# Patient Record
Sex: MALE | Race: WHITE | Employment: OTHER | URBAN - METROPOLITAN AREA
[De-identification: names, ages, dates, MRNs, and addresses within clinical notes are randomized per-mention and may not be internally consistent; named-entity substitution may affect disease eponyms.]

---

## 2017-07-13 DIAGNOSIS — E78.5 HYPERLIPIDEMIA LDL GOAL <100: Primary | ICD-10-CM

## 2017-07-13 LAB
ALBUMIN SERPL-MCNC: 3.9 G/DL (ref 3.4–5)
ALP SERPL-CCNC: 68 U/L (ref 40–150)
ALT SERPL W P-5'-P-CCNC: 29 U/L (ref 0–70)
ANION GAP SERPL CALCULATED.3IONS-SCNC: 7 MMOL/L (ref 3–14)
AST SERPL W P-5'-P-CCNC: 20 U/L (ref 0–45)
BILIRUB SERPL-MCNC: 0.6 MG/DL (ref 0.2–1.3)
BUN SERPL-MCNC: 19 MG/DL (ref 7–30)
CALCIUM SERPL-MCNC: 8.6 MG/DL (ref 8.5–10.1)
CHLORIDE SERPL-SCNC: 106 MMOL/L (ref 94–109)
CHOLEST SERPL-MCNC: 144 MG/DL
CO2 SERPL-SCNC: 26 MMOL/L (ref 20–32)
CREAT SERPL-MCNC: 1.21 MG/DL (ref 0.66–1.25)
GFR SERPL CREATININE-BSD FRML MDRD: 59 ML/MIN/1.7M2
GLUCOSE SERPL-MCNC: 92 MG/DL (ref 70–99)
HBA1C MFR BLD: 5.5 % (ref 4.3–6)
HDLC SERPL-MCNC: 42 MG/DL
LDLC SERPL CALC-MCNC: 60 MG/DL
NONHDLC SERPL-MCNC: 102 MG/DL
POTASSIUM SERPL-SCNC: 3.8 MMOL/L (ref 3.4–5.3)
PROT SERPL-MCNC: 7.2 G/DL (ref 6.8–8.8)
SODIUM SERPL-SCNC: 140 MMOL/L (ref 133–144)
TRIGL SERPL-MCNC: 208 MG/DL

## 2017-07-17 ENCOUNTER — OFFICE VISIT (OUTPATIENT)
Dept: CARDIOLOGY | Facility: CLINIC | Age: 72
End: 2017-07-17

## 2017-07-17 VITALS
DIASTOLIC BLOOD PRESSURE: 78 MMHG | HEART RATE: 55 BPM | HEIGHT: 67 IN | SYSTOLIC BLOOD PRESSURE: 154 MMHG | BODY MASS INDEX: 30.29 KG/M2 | OXYGEN SATURATION: 97 % | WEIGHT: 193 LBS

## 2017-07-17 DIAGNOSIS — R53.83 FATIGUE, UNSPECIFIED TYPE: ICD-10-CM

## 2017-07-17 DIAGNOSIS — I10 ESSENTIAL HYPERTENSION: Primary | ICD-10-CM

## 2017-07-17 DIAGNOSIS — I10 ESSENTIAL HYPERTENSION: ICD-10-CM

## 2017-07-17 LAB
ERYTHROCYTE [DISTWIDTH] IN BLOOD BY AUTOMATED COUNT: 12.9 % (ref 10–15)
HCT VFR BLD AUTO: 43.1 % (ref 40–53)
HGB BLD-MCNC: 14.8 G/DL (ref 13.3–17.7)
MCH RBC QN AUTO: 30.7 PG (ref 26.5–33)
MCHC RBC AUTO-ENTMCNC: 34.3 G/DL (ref 31.5–36.5)
MCV RBC AUTO: 89 FL (ref 78–100)
PLATELET # BLD AUTO: 279 10E9/L (ref 150–450)
RBC # BLD AUTO: 4.82 10E12/L (ref 4.4–5.9)
TSH SERPL DL<=0.005 MIU/L-ACNC: 0.53 MU/L (ref 0.4–4)
WBC # BLD AUTO: 8.1 10E9/L (ref 4–11)

## 2017-07-17 RX ORDER — BRIMONIDINE TARTRATE 1.5 MG/ML
1 SOLUTION/ DROPS OPHTHALMIC 2 TIMES DAILY
COMMUNITY
Start: 2017-05-23

## 2017-07-17 RX ORDER — LOSARTAN POTASSIUM 100 MG/1
100 TABLET ORAL DAILY
Qty: 90 TABLET | Refills: 0 | Status: SHIPPED | OUTPATIENT
Start: 2017-07-17 | End: 2017-10-17

## 2017-07-17 RX ORDER — HYOSCYAMINE SULFATE 0.125 MG
0.12 TABLET ORAL PRN
COMMUNITY
Start: 2016-10-18

## 2017-07-17 NOTE — LETTER
7/17/2017      RE: Junior Ruiz  [address]   [facility] [address]       Dear Colleague,    Thank you for the opportunity to participate in the care of your patient, Junior Ruiz, at the [facility]. Please see a copy of my visit note below.    PROBLEM LIST  Patient Active Problem List   Diagnosis     Hyperlipidemia     Tremor     Hypertension     Lacunar stroke (H)     Abnormal carotid ultrasound     Gout     Depression     Anxiety     Glaucoma     Esophageal reflux     Erectile dysfunction     Melanoma (HCC)     Lacunar infarction (HCC)     Gastric ulcer     Basal cell carcinoma     Hearing loss     S/P cataract surgery     Wears glasses     Wears hearing aid     HL (hearing loss), left     Snores     Sleep disorder     Arthritis     Laboratory test       HPI:   Junior Ruiz is a 71 year old year old male with a history of  lacunar stroke per MRI age 51, hypertension, hyperlipidemia, gout and glaucoma. He was feeling depressed and negative in the mornings and felt it was related to his valsartan so he DC'd it about 3 weeks ago and his symptoms resolved. He has also been trying to taper his Niacin and the dose is down to 250 mg every other day. When he tried to abruptly DC he had symptoms of mood swings and negative thoughts.    He does report feeling sluggish and wonders if it is related to his pulse which runs in the upper 40-50's. He is not taking any oral medications that typically effect heart rate. He is able to exercise and does a 45 minute work out most days. He has lost about 20 pounds about 1.5 years ago. He has not chest pain or shortness of breath.     PAST MEDICAL HISTORY:  Past Medical History:   Diagnosis Date     Abnormal carotid ultrasound 4/9/2016    11/2/15  2:55 PM [id number] [facility], [place], Ultrasound    [employer] Interactive Report and InfoRx    View the  interactive report   PACS Images    Show images for US carotid bilateral   Study Result    US CAROTID BILATERAL 11/2/2015 2:55 PM   Clinical history: Encounter for screening for cardiovascular disease, tremor, unspecified, cerebral infarction, unspecified the occlusion and stenosis of bilat     Anxiety 4/9/2016     Basal cell carcinoma 2015     Cardiovascular risk factor 10/2015    Mccallum early CVD score 8/20 with 0 all tests normal     Depression 4/9/2016     Erectile dysfunction      Esophageal reflux 4/9/2016     Gastric ulcer age 24     Glaucoma 1995     Glaucoma 4/9/2016     Gout 2007     Gout 4/9/2016     H/O echocardiogram 2005    reported to be normal     Hearing loss      HL (hearing loss), left 4/12/2016     Hyperlipidemia age 36     Hypertension age 46     Laboratory test 4/27/2016    Octavio Pratt MD 4/27/2016           Narrative         Examination: Nuclear medicine DATscan for Dopamine Receptor Localization.   Examination: NM BRAIN IMAGING TOMOGRAPHIC (SPECT) DATSCAN  Date: 4/27/2016 3:54 PM  Indication: Tremor  Comparison: None  Additional Information: none  Interfering Medications: None  Technique:  The patient initially received 1 ml of Lugol's solution orally p     Lacunar infarction (H) age 51    per MRI     Lacunar stroke (H) age 51    per CT     Melanoma (H) 2011    back     S/P cataract surgery 4/12/2016     Tremor     ? related to lacunar CVA     Wears glasses 4/12/2016     Wears hearing aid 4/12/2016       CURRENT MEDICATIONS:  Current Outpatient Prescriptions   Medication Sig Dispense Refill     brimonidine (ALPHAGAN-P) 0.15 % ophthalmic solution Place 1 drop into both eyes 2 times daily       hyoscyamine (ANASPAZ/LEVSIN) 0.125 MG tablet Take 0.125 mg by mouth as needed       losartan (COZAAR) 100 MG tablet Take 1 tablet (100 mg) by mouth daily 90 tablet 0     atorvastatin (LIPITOR) 20 MG tablet Take 1 tablet (20 mg) by mouth daily 90 tablet 3     niacin (NIASPAN) 500 MG CR  tablet Take 1 tablet (500 mg) by mouth At Bedtime 30 tablet 3     allopurinol (ZYLOPRIM) 300 MG tablet Take 300 mg by mouth daily       AMLODIPINE BESYLATE PO Take 5 mg by mouth 2 times daily       aspirin 81 MG tablet Take 81 mg by mouth daily       Omega-3 Fatty Acids (OMEGA-3 FISH OIL PO) Take 1 g by mouth daily       hydrochlorothiazide (MICROZIDE) 12.5 MG capsule Take 12.5 mg by mouth 2 times daily        bimatoprost (LUMIGAN) 0.01 % SOLN Place 1 drop into both eyes At Bedtime       Multiple Vitamins-Minerals (MULTIVITAMIN PO) Take 1 tablet by mouth daily       Cholecalciferol (VITAMIN D3 PO) Take 1,000 Units by mouth daily       Tadalafil (CIALIS PO) Take 1 tablet by mouth as needed for erectile dysfunction       hyoscyamine (LEVBID) 0.375 MG 12 hr tablet Take 0.375 mg by mouth every 12 hours as needed for cramping       valsartan (DIOVAN) 80 MG tablet Take 1 tablet (80 mg) by mouth daily (Patient not taking: Reported on 2017) 90 tablet 3       PAST SURGICAL HISTORY:  Past Surgical History:   Procedure Laterality Date     BACK SURGERY      skin cancer, melanoma     EYE SURGERY Bilateral     glaucoma     EYE SURGERY Left     cataract     EYE SURGERY Right     cataract      HEMORRHOID SURGERY       KNEE SURGERY Right     arthroscopy     RHINOPLASTY  1979       ALLERGIES     Allergies   Allergen Reactions     Atorvastatin Other (See Comments)     Muscle pain with 80 mg, ok with 40 mg per day     Irbesartan      Head congestion with 150 mg dosage.     Bentyl [Dicyclomine] Itching     Colchicine      Lisinopril Cough     Penicillins      Sulfamethoxazole-Trimethoprim Other (See Comments)     dizziness     Verapamil      Changes in thinking       FAMILY HISTORY:  Family History   Problem Relation Age of Onset     Hypertension Mother 65     92 in 2015     CEREBROVASCULAR DISEASE Mother 65     Coronary Artery Disease Father 58      age 92     CEREBROVASCULAR DISEASE Father 65     Abdominal Aortic  Aneurysm Father      Brain Cancer Son      Hypertension Sister 60     65 in 2016       SOCIAL HISTORY:  Social History     Social History     Marital status:      Spouse name: N/A     Number of children: N/A     Years of education: N/A     Social History Main Topics     Smoking status: Former Smoker     Packs/day: 1.00     Types: Cigarettes     Start date: 10/19/1971     Quit date: 10/19/1991     Smokeless tobacco: Not on file     Alcohol use 4.2 oz/week     7 Glasses of wine per week      Comment: alcohol may help his writing      Drug use: Not on file     Sexual activity: Not on file     Other Topics Concern     Not on file     Social History Antony    Works as ,  and is single  with 2 children ages 33 and 29.      Enjoyment of life is 7 with 10 enjoys life fully.         and Viyet.        Junior Ruiz is a 69 year old year old male with a history of lacunar stroke per MRI age 51, hypertension, hyperlipidemia, gout and glaucoma.         He has a family history of cardiovascular disease with both parents, father had AAA and CABG in his late 50's and mother CVA in her 60's but both survived into their 90's.         He has no chest pain or shortness of breath but does have some lower leg edema related to amlodipine. He tried a higher dose of irbesartan but developed head congestion. He is able to tolerate atorvastatin 40 mg per day but developed muscle pain with 80 mg  Dosage. He reports an echo about 10 years ago that was normal and a CAC score about 10 years ago that was consistent with his age but does not recall a score.         He has a history of elevated triglycerides in the 3-500's prior to taking atorvastatin and niaspan. He also has a history of tremor thought to be related to past lacunar stroke. The tremor is increasingly worse and makes witting difficult. We discussed referal to neurology. He has tried atenolol in the  "past for tremor but his heart rate decreased to the 40's and he did not feel well.       ROS:   Constitutional: No fever, chills, or sweats. No weight gain/loss   ENT: No visual disturbance, ear ache, epistaxis, sore throat  Allergies/Immunologic: Negative.   Respiratory: No cough, hemoptysia  Cardiovascular: As per HPI  GI: No nausea, vomiting, hematemesis, melena, or hematochezia  : No urinary frequency, dysuria, or hematuria  Integument: Negative  Psychiatric: Negative  Neuro: Negative  Endocrinology: Negative   Musculoskeletal: Negative    EXAM:  /78 (BP Location: Right arm, Patient Position: Sitting, Cuff Size: Adult Regular)  Pulse 55  Ht 1.702 m (5' 7\")  Wt 87.5 kg (193 lb)  SpO2 97%  BMI 30.23 kg/m2  In general, the patient is a pleasant male in no apparent distress.    HEENT: NC/AT.  PERRLA.  EOMI.  Sclerae white, not injected.  Nares clear.  Pharynx without erythema or exudate.  Dentition intact.    Neck: No adenopathy.  No thyromegaly. Carotids +4/4 bilaterally without bruits.  No jugular venous distension.   Heart: RRR. Normal S1, S2 splits physiologically. No murmur, rub, click, or gallop. The PMI is in the 5th ICS in the midclavicular line. There is no heave.    Lungs: CTA.  No ronchi, wheezes, rales.  No dullness to percussion.   Abdomen: Soft, nontender, nondistended. No organomegaly.  No bruits.   Extremities: No clubbing, cyanosis, or edema.  The pulses are +4/4 at the radial, brachial, femoral, popliteal, DP, and PT sites bilaterally.  No bruits are noted.      Labs:  LIPID RESULTS:  Lab Results   Component Value Date    CHOL 144 07/12/2017     Lab Results   Component Value Date    HDL 42 07/12/2017     Lab Results   Component Value Date    LDL 60 07/12/2017     Lab Results   Component Value Date    TRIG 208 07/12/2017     Lab Results   Component Value Date    CHOLHDLRATIO 2.2 10/19/2015       Lab Results   Component Value Date    AST 20 07/12/2017     Lab Results   Component Value " "Date    ALT 29 07/12/2017       CBC RESULTS:  Lab Results   Component Value Date    HGB 14.8 07/17/2017     Lab Results   Component Value Date    WBC 8.1 07/17/2017     Lab Results   Component Value Date    RBC 4.82 07/17/2017     Lab Results   Component Value Date    HCT 43.1 07/17/2017     Lab Results   Component Value Date    MCV 89 07/17/2017     Lab Results   Component Value Date    MCH 30.7 07/17/2017     Lab Results   Component Value Date    MCHC 34.3 07/17/2017     Lab Results   Component Value Date    RDW 12.9 07/17/2017     Lab Results   Component Value Date     07/17/2017       BMP RESULTS:  Lab Results   Component Value Date     07/12/2017      Lab Results   Component Value Date    POTASSIUM 3.8 07/12/2017     Lab Results   Component Value Date    CHLORIDE 106 07/12/2017     Lab Results   Component Value Date    SUNNY 8.6 07/12/2017     Lab Results   Component Value Date    CO2 26 07/12/2017     Lab Results   Component Value Date    BUN 19 07/12/2017     Lab Results   Component Value Date    CR 1.21 07/12/2017     Lab Results   Component Value Date    GFRESTIMATED 59 07/12/2017     Lab Results   Component Value Date    GLC 92 07/12/2017       Lab Results   Component Value Date    A1C 5.5 07/12/2017     No results found for: INR    Procedures:      Assessment and Plan:   Cardiovascular: No chest pain or shortness of breath. No new neurological symptoms. He does have some \"sluggish, lethargy\" at times and wonders if this is related to heart rate. Unlikely his symptoms are related to his pulse as there has not been significant change in the last year.  Will check TSH and Hg. He sleeps alone so does not know if he has sleep apnea or not. We discussed possible sleep study to check for sleep apnea which can also contribute to mental health problems. We also discussed if he feels depressed or short temper to discuss with family provider consideration of SSRI.     Blood pressure: Elevated today since " he DC'd valsartan because of possible mood alteration. This is not a common side effect but he feels better since he is not taking it. Will attempt losartan 100 mg per day to see if it is better tolerated. He will check home BP's and call me in two weeks to let me know how he is doing with medication change.     Lipids: at target with 20 mg of atorvastatin. Alteration in mood has been identified with some people taking atorvastatin but he has been on this medication for some time without problems. Triglycerides are mildly elevated with reduction of Niacin. Will increase fish oil if his triglycerides continue to increase.       Please do not hesitate to contact me if you have any questions/concerns.     Sincerely,     ANUP Sainz CNP

## 2017-07-17 NOTE — PROGRESS NOTES
PROBLEM LIST  Patient Active Problem List   Diagnosis     Hyperlipidemia     Tremor     Hypertension     Lacunar stroke (H)     Abnormal carotid ultrasound     Gout     Depression     Anxiety     Glaucoma     Esophageal reflux     Erectile dysfunction     Melanoma (HCC)     Lacunar infarction (HCC)     Gastric ulcer     Basal cell carcinoma     Hearing loss     S/P cataract surgery     Wears glasses     Wears hearing aid     HL (hearing loss), left     Snores     Sleep disorder     Arthritis     Laboratory test       HPI:   Junior Ruiz is a 71 year old year old male with a history of  lacunar stroke per MRI age 51, hypertension, hyperlipidemia, gout and glaucoma. He was feeling depressed and negative in the mornings and felt it was related to his valsartan so he DC'd it about 3 weeks ago and his symptoms resolved. He has also been trying to taper his Niacin and the dose is down to 250 mg every other day. When he tried to abruptly DC he had symptoms of mood swings and negative thoughts.    He does report feeling sluggish and wonders if it is related to his pulse which runs in the upper 40-50's. He is not taking any oral medications that typically effect heart rate. He is able to exercise and does a 45 minute work out most days. He has lost about 20 pounds about 1.5 years ago. He has not chest pain or shortness of breath.     PAST MEDICAL HISTORY:  Past Medical History:   Diagnosis Date     Abnormal carotid ultrasound 4/9/2016    11/2/15  2:55 PM [id number] [facility] [place], Ultrasound    [employer] Interactive Report and InfoRx    View the interactive report   PACS Images    Show images for US carotid bilateral   Study Result    US CAROTID BILATERAL 11/2/2015 2:55 PM   Clinical history: Encounter for screening for cardiovascular disease, tremor, unspecified, cerebral infarction, unspecified the occlusion and stenosis of bilat     Anxiety 4/9/2016     Basal cell carcinoma 2015     Cardiovascular risk factor  10/2015    Mccallum early CVD score 8/20 with 0 all tests normal     Depression 4/9/2016     Erectile dysfunction      Esophageal reflux 4/9/2016     Gastric ulcer age 24     Glaucoma 1995     Glaucoma 4/9/2016     Gout 2007     Gout 4/9/2016     H/O echocardiogram 2005    reported to be normal     Hearing loss      HL (hearing loss), left 4/12/2016     Hyperlipidemia age 36     Hypertension age 46     Laboratory test 4/27/2016    Octavio Pratt MD 4/27/2016           Narrative         Examination: Nuclear medicine DATscan for Dopamine Receptor Localization.   Examination: NM BRAIN IMAGING TOMOGRAPHIC (SPECT) DATSCAN  Date: 4/27/2016 3:54 PM  Indication: Tremor  Comparison: None  Additional Information: none  Interfering Medications: None  Technique:  The patient initially received 1 ml of Lugol's solution orally p     Lacunar infarction (H) age 51    per MRI     Lacunar stroke (H) age 51    per CT     Melanoma (H) 2011    back     S/P cataract surgery 4/12/2016     Tremor     ? related to lacunar CVA     Wears glasses 4/12/2016     Wears hearing aid 4/12/2016       CURRENT MEDICATIONS:  Current Outpatient Prescriptions   Medication Sig Dispense Refill     brimonidine (ALPHAGAN-P) 0.15 % ophthalmic solution Place 1 drop into both eyes 2 times daily       hyoscyamine (ANASPAZ/LEVSIN) 0.125 MG tablet Take 0.125 mg by mouth as needed       losartan (COZAAR) 100 MG tablet Take 1 tablet (100 mg) by mouth daily 90 tablet 0     atorvastatin (LIPITOR) 20 MG tablet Take 1 tablet (20 mg) by mouth daily 90 tablet 3     niacin (NIASPAN) 500 MG CR tablet Take 1 tablet (500 mg) by mouth At Bedtime 30 tablet 3     allopurinol (ZYLOPRIM) 300 MG tablet Take 300 mg by mouth daily       AMLODIPINE BESYLATE PO Take 5 mg by mouth 2 times daily       aspirin 81 MG tablet Take 81 mg by mouth daily       Omega-3 Fatty Acids (OMEGA-3 FISH OIL PO) Take 1 g by mouth daily       hydrochlorothiazide (MICROZIDE) 12.5 MG capsule Take  12.5 mg by mouth 2 times daily        bimatoprost (LUMIGAN) 0.01 % SOLN Place 1 drop into both eyes At Bedtime       Multiple Vitamins-Minerals (MULTIVITAMIN PO) Take 1 tablet by mouth daily       Cholecalciferol (VITAMIN D3 PO) Take 1,000 Units by mouth daily       Tadalafil (CIALIS PO) Take 1 tablet by mouth as needed for erectile dysfunction       hyoscyamine (LEVBID) 0.375 MG 12 hr tablet Take 0.375 mg by mouth every 12 hours as needed for cramping       valsartan (DIOVAN) 80 MG tablet Take 1 tablet (80 mg) by mouth daily (Patient not taking: Reported on 2017) 90 tablet 3       PAST SURGICAL HISTORY:  Past Surgical History:   Procedure Laterality Date     BACK SURGERY      skin cancer, melanoma     EYE SURGERY Bilateral     glaucoma     EYE SURGERY Left     cataract     EYE SURGERY Right     cataract      HEMORRHOID SURGERY       KNEE SURGERY Right     arthroscopy     RHINOPLASTY         ALLERGIES     Allergies   Allergen Reactions     Atorvastatin Other (See Comments)     Muscle pain with 80 mg, ok with 40 mg per day     Irbesartan      Head congestion with 150 mg dosage.     Bentyl [Dicyclomine] Itching     Colchicine      Lisinopril Cough     Penicillins      Sulfamethoxazole-Trimethoprim Other (See Comments)     dizziness     Verapamil      Changes in thinking       FAMILY HISTORY:  Family History   Problem Relation Age of Onset     Hypertension Mother 65     92 in 2015     CEREBROVASCULAR DISEASE Mother 65     Coronary Artery Disease Father 58      age 92     CEREBROVASCULAR DISEASE Father 65     Abdominal Aortic Aneurysm Father      Brain Cancer Son      Hypertension Sister 60     65 in 2016       SOCIAL HISTORY:  Social History     Social History     Marital status:      Spouse name: N/A     Number of children: N/A     Years of education: N/A     Social History Main Topics     Smoking status: Former Smoker     Packs/day: 1.00     Types: Cigarettes     Start date: 10/19/1971      Quit date: 10/19/1991     Smokeless tobacco: Not on file     Alcohol use 4.2 oz/week     7 Glasses of wine per week      Comment: alcohol may help his writing      Drug use: Not on file     Sexual activity: Not on file     Other Topics Concern     Not on file     Social History Antony    Works as ,  and is single  with 2 children ages 33 and 29.      Enjoyment of life is 7 with 10 enjoys life fully.         and [employer].        Junior Ruiz is a 69 year old year old male with a history of lacunar stroke per MRI age 51, hypertension, hyperlipidemia, gout and glaucoma.         He has a family history of cardiovascular disease with both parents, father had AAA and CABG in his late 50's and mother CVA in her 60's but both survived into their 90's.         He has no chest pain or shortness of breath but does have some lower leg edema related to amlodipine. He tried a higher dose of irbesartan but developed head congestion. He is able to tolerate atorvastatin 40 mg per day but developed muscle pain with 80 mg  Dosage. He reports an echo about 10 years ago that was normal and a CAC score about 10 years ago that was consistent with his age but does not recall a score.         He has a history of elevated triglycerides in the 3-500's prior to taking atorvastatin and niaspan. He also has a history of tremor thought to be related to past lacunar stroke. The tremor is increasingly worse and makes witting difficult. We discussed referal to neurology. He has tried atenolol in the past for tremor but his heart rate decreased to the 40's and he did not feel well.       ROS:   Constitutional: No fever, chills, or sweats. No weight gain/loss   ENT: No visual disturbance, ear ache, epistaxis, sore throat  Allergies/Immunologic: Negative.   Respiratory: No cough, hemoptysia  Cardiovascular: As per HPI  GI: No nausea, vomiting, hematemesis, melena, or  "hematochezia  : No urinary frequency, dysuria, or hematuria  Integument: Negative  Psychiatric: Negative  Neuro: Negative  Endocrinology: Negative   Musculoskeletal: Negative    EXAM:  /78 (BP Location: Right arm, Patient Position: Sitting, Cuff Size: Adult Regular)  Pulse 55  Ht 1.702 m (5' 7\")  Wt 87.5 kg (193 lb)  SpO2 97%  BMI 30.23 kg/m2  In general, the patient is a pleasant male in no apparent distress.    HEENT: NC/AT.  PERRLA.  EOMI.  Sclerae white, not injected.  Nares clear.  Pharynx without erythema or exudate.  Dentition intact.    Neck: No adenopathy.  No thyromegaly. Carotids +4/4 bilaterally without bruits.  No jugular venous distension.   Heart: RRR. Normal S1, S2 splits physiologically. No murmur, rub, click, or gallop. The PMI is in the 5th ICS in the midclavicular line. There is no heave.    Lungs: CTA.  No ronchi, wheezes, rales.  No dullness to percussion.   Abdomen: Soft, nontender, nondistended. No organomegaly.  No bruits.   Extremities: No clubbing, cyanosis, or edema.  The pulses are +4/4 at the radial, brachial, femoral, popliteal, DP, and PT sites bilaterally.  No bruits are noted.      Labs:  LIPID RESULTS:  Lab Results   Component Value Date    CHOL 144 07/12/2017     Lab Results   Component Value Date    HDL 42 07/12/2017     Lab Results   Component Value Date    LDL 60 07/12/2017     Lab Results   Component Value Date    TRIG 208 07/12/2017     Lab Results   Component Value Date    CHOLHDLRATIO 2.2 10/19/2015       Lab Results   Component Value Date    AST 20 07/12/2017     Lab Results   Component Value Date    ALT 29 07/12/2017       CBC RESULTS:  Lab Results   Component Value Date    HGB 14.8 07/17/2017     Lab Results   Component Value Date    WBC 8.1 07/17/2017     Lab Results   Component Value Date    RBC 4.82 07/17/2017     Lab Results   Component Value Date    HCT 43.1 07/17/2017     Lab Results   Component Value Date    MCV 89 07/17/2017     Lab Results " "  Component Value Date    MCH 30.7 07/17/2017     Lab Results   Component Value Date    MCHC 34.3 07/17/2017     Lab Results   Component Value Date    RDW 12.9 07/17/2017     Lab Results   Component Value Date     07/17/2017       BMP RESULTS:  Lab Results   Component Value Date     07/12/2017      Lab Results   Component Value Date    POTASSIUM 3.8 07/12/2017     Lab Results   Component Value Date    CHLORIDE 106 07/12/2017     Lab Results   Component Value Date    SUNNY 8.6 07/12/2017     Lab Results   Component Value Date    CO2 26 07/12/2017     Lab Results   Component Value Date    BUN 19 07/12/2017     Lab Results   Component Value Date    CR 1.21 07/12/2017     Lab Results   Component Value Date    GFRESTIMATED 59 07/12/2017     Lab Results   Component Value Date    GLC 92 07/12/2017       Lab Results   Component Value Date    A1C 5.5 07/12/2017     No results found for: INR    Procedures:      Assessment and Plan:   Cardiovascular: No chest pain or shortness of breath. No new neurological symptoms. He does have some \"sluggish, lethargy\" at times and wonders if this is related to heart rate. Unlikely his symptoms are related to his pulse as there has not been significant change in the last year.  Will check TSH and Hg. He sleeps alone so does not know if he has sleep apnea or not. We discussed possible sleep study to check for sleep apnea which can also contribute to mental health problems. We also discussed if he feels depressed or short temper to discuss with family provider consideration of SSRI.     Blood pressure: Elevated today since he DC'd valsartan because of possible mood alteration. This is not a common side effect but he feels better since he is not taking it. Will attempt losartan 100 mg per day to see if it is better tolerated. He will check home BP's and call me in two weeks to let me know how he is doing with medication change.     Lipids: at target with 20 mg of atorvastatin. " Alteration in mood has been identified with some people taking atorvastatin but he has been on this medication for some time without problems. Triglycerides are mildly elevated with reduction of Niacin. Will increase fish oil if his triglycerides continue to increase.

## 2017-07-17 NOTE — PATIENT INSTRUCTIONS
Begin losartan 100 mg per day in place of valsartan.   Call Brittany [phone] in two weeks with 6 blood pressure readings, alternate morning, afternoon and evening.  Call sooner if not feeling well on the medication.

## 2017-07-17 NOTE — MR AVS SNAPSHOT
After Visit Summary   7/17/2017    Junior Ruiz    MRN: [id number]           Patient Information     Date Of Birth          1945        Visit Information        Provider Department      7/17/2017 1:00 PM Brittany Carlisle APRN CNP [facility]        Today's Diagnoses     Essential hypertension    -  1      Care Instructions    Begin losartan 100 mg per day in place of valsartan.   Call Brittany [phone] in two weeks with 6 blood pressure readings, alternate morning, afternoon and evening.  Call sooner if not feeling well on the medication.          Follow-ups after your visit        Who to contact     Please call your clinic at [phone] to:    Ask questions about your health    Make or cancel appointments    Discuss your medicines    Learn about your test results    Speak to your doctor   If you have compliments or concerns about an experience at your clinic, or if you wish to file a complaint, please contact [facility] Patient Relations at [phone] or email us at [email or web address]         Additional Information About Your Visit        MyChart Information     [employer] gives you secure access to your electronic health record. If you see a primary care provider, you can also send messages to your care team and make appointments. If you have questions, please call your primary care clinic.  If you do not have a primary care provider, please call [phone] and they will assist you.      [employer] is an electronic gateway that provides easy, online access to your medical records. With [employer], you can request a clinic appointment, read your test results, renew a prescription or communicate with your care team.     To access your existing account, please contact your [facility] or call [phone] for assistance.        Care EveryWhere ID     This is your Care  "EveryWhere ID. This could be used by other organizations to access your [place] medical records  [id number]        Your Vitals Were     Pulse Height Pulse Oximetry BMI (Body Mass Index)          55 1.702 m (5' 7\") 97% 30.23 kg/m2         Blood Pressure from Last 3 Encounters:   07/17/17 154/78   07/18/16 109/46   05/05/16 154/71    Weight from Last 3 Encounters:   07/17/17 87.5 kg (193 lb)   07/18/16 83 kg (183 lb)   05/05/16 86.1 kg (189 lb 12.8 oz)              Today, you had the following     No orders found for display         Today's Medication Changes          These changes are accurate as of: 7/17/17  1:40 PM.  If you have any questions, ask your nurse or doctor.               Start taking these medicines.        Dose/Directions    losartan 100 MG tablet   Commonly known as:  COZAAR   Used for:  Essential hypertension   Started by:  Brittany Carlisle APRN CNP        Dose:  100 mg   Take 1 tablet (100 mg) by mouth daily   Quantity:  90 tablet   Refills:  0            Where to get your medicines      These medications were sent to [facility] #[id number] - [address] [address]     Phone:  [phone]     losartan 100 MG tablet                Primary Care Provider Office Phone # Fax #    Serjio Love [phone]       [facility] [address] [facility] [address]        Equal Access to Services     [facility]: Hadii jeffery ku hadasho Soomaali, waaxda luqadaha, qaybta kaalmada adeegyada, waxrafal idiin hayaan adeletty brown. So [facility] [phone].    ATENCIÓN: Si habla español, tiene a pierson disposición servicios gratuitos de asistencia lingüística. Llame al [phone].    We comply with applicable federal civil rights laws and [place] laws. We do not discriminate on the basis of race, color, national origin, age, disability sex, sexual orientation or gender identity.            Thank you!     " Thank you for choosing [facility]  for your care. Our goal is always to provide you with excellent care. Hearing back from our patients is one way we can continue to improve our services. Please take a few minutes to complete the written survey that you may receive in the mail after your visit with us. Thank you!             Your Updated Medication List - Protect others around you: Learn how to safely use, store and throw away your medicines at www.disposemymeds.org.          This list is accurate as of: 7/17/17  1:40 PM.  Always use your most recent med list.                   Brand Name Dispense Instructions for use Diagnosis    allopurinol 300 MG tablet    ZYLOPRIM     Take 300 mg by mouth daily    Screening for cardiovascular condition, Tremor, Elevated glucose, Lacunar stroke (H), Carotid artery plaque, bilateral       AMLODIPINE BESYLATE PO      Take 5 mg by mouth 2 times daily    Screening for cardiovascular condition, Tremor, Elevated glucose, Lacunar stroke (H), Carotid artery plaque, bilateral       aspirin 81 MG tablet      Take 81 mg by mouth daily    Screening for cardiovascular condition, Tremor, Elevated glucose, Lacunar stroke (H), Carotid artery plaque, bilateral       atorvastatin 20 MG tablet    LIPITOR    90 tablet    Take 1 tablet (20 mg) by mouth daily    Hyperlipidemia, unspecified hyperlipidemia       bimatoprost 0.01 % Soln    LUMIGAN     Place 1 drop into both eyes At Bedtime    Screening for cardiovascular condition, Tremor, Elevated glucose, Lacunar stroke (H), Carotid artery plaque, bilateral       brimonidine 0.15 % ophthalmic solution    ALPHAGAN-P     Place 1 drop into both eyes 2 times daily    Essential hypertension       CIALIS PO      Take 1 tablet by mouth as needed for erectile dysfunction    Screening for cardiovascular condition, Tremor, Elevated glucose, Lacunar stroke (H), Carotid artery plaque, bilateral       hydrochlorothiazide  12.5 MG capsule    MICROZIDE     Take 12.5 mg by mouth 2 times daily    Screening for cardiovascular condition, Tremor, Elevated glucose, Lacunar stroke (H), Carotid artery plaque, bilateral       * hyoscyamine 0.375 MG 12 hr tablet    LEVBID     Take 0.375 mg by mouth every 12 hours as needed for cramping    Screening for cardiovascular condition, Tremor, Elevated glucose, Lacunar stroke (H), Carotid artery plaque, bilateral       * hyoscyamine 0.125 MG tablet    ANASPAZ/LEVSIN     Take 0.125 mg by mouth as needed    Essential hypertension       losartan 100 MG tablet    COZAAR    90 tablet    Take 1 tablet (100 mg) by mouth daily    Essential hypertension       MULTIVITAMIN PO      Take 1 tablet by mouth daily    Screening for cardiovascular condition, Tremor, Elevated glucose, Lacunar stroke (H), Carotid artery plaque, bilateral       niacin 500 MG CR tablet    NIASPAN    30 tablet    Take 1 tablet (500 mg) by mouth At Bedtime    Hyperlipidemia, unspecified hyperlipidemia       OMEGA-3 FISH OIL PO      Take 1 g by mouth daily    Screening for cardiovascular condition, Tremor, Elevated glucose, Lacunar stroke (H), Carotid artery plaque, bilateral       valsartan 80 MG tablet    DIOVAN    90 tablet    Take 1 tablet (80 mg) by mouth daily    Essential hypertension with goal blood pressure less than 140/90, Hyperlipidemia, unspecified hyperlipidemia       VITAMIN D3 PO      Take 1,000 Units by mouth daily    Screening for cardiovascular condition, Tremor, Elevated glucose, Lacunar stroke (H), Carotid artery plaque, bilateral       * Notice:  This list has 2 medication(s) that are the same as other medications prescribed for you. Read the directions carefully, and ask your doctor or other care provider to review them with you.

## 2017-10-17 DIAGNOSIS — R53.83 FATIGUE, UNSPECIFIED TYPE: ICD-10-CM

## 2017-10-17 DIAGNOSIS — I10 ESSENTIAL HYPERTENSION: ICD-10-CM

## 2017-10-17 RX ORDER — LOSARTAN POTASSIUM 100 MG/1
100 TABLET ORAL DAILY
Qty: 90 TABLET | Refills: 3 | Status: SHIPPED | OUTPATIENT
Start: 2017-10-17 | End: 2018-07-27

## 2018-07-24 DIAGNOSIS — R53.83 FATIGUE, UNSPECIFIED TYPE: ICD-10-CM

## 2018-07-24 DIAGNOSIS — I10 ESSENTIAL HYPERTENSION: ICD-10-CM

## 2018-07-24 RX ORDER — LOSARTAN POTASSIUM 100 MG/1
100 TABLET ORAL DAILY
Qty: 90 TABLET | Refills: 3 | Status: CANCELLED | OUTPATIENT
Start: 2018-07-24

## 2018-07-27 DIAGNOSIS — R53.83 FATIGUE, UNSPECIFIED TYPE: ICD-10-CM

## 2018-07-27 DIAGNOSIS — I10 ESSENTIAL HYPERTENSION: ICD-10-CM

## 2018-07-27 RX ORDER — LOSARTAN POTASSIUM 100 MG/1
100 TABLET ORAL DAILY
Qty: 90 TABLET | Refills: 3 | Status: SHIPPED | OUTPATIENT
Start: 2018-07-27

## 2018-11-18 DIAGNOSIS — E78.5 HYPERLIPIDEMIA LDL GOAL <70: Primary | ICD-10-CM

## 2018-11-18 DIAGNOSIS — I10 ESSENTIAL HYPERTENSION: ICD-10-CM

## 2019-10-04 ENCOUNTER — HEALTH MAINTENANCE LETTER (OUTPATIENT)
Age: 74
End: 2019-10-04

## 2020-02-10 ENCOUNTER — HEALTH MAINTENANCE LETTER (OUTPATIENT)
Age: 75
End: 2020-02-10

## 2020-11-14 ENCOUNTER — HEALTH MAINTENANCE LETTER (OUTPATIENT)
Age: 75
End: 2020-11-14

## 2021-03-02 ENCOUNTER — IMMUNIZATION (OUTPATIENT)
Dept: PEDIATRICS | Facility: CLINIC | Age: 76
End: 2021-03-02
Payer: MEDICARE

## 2021-03-02 PROCEDURE — 91300 PR COVID VAC PFIZER DIL RECON 30 MCG/0.3 ML IM: CPT

## 2021-03-02 PROCEDURE — 0001A PR COVID VAC PFIZER DIL RECON 30 MCG/0.3 ML IM: CPT

## 2021-03-23 ENCOUNTER — IMMUNIZATION (OUTPATIENT)
Dept: PEDIATRICS | Facility: CLINIC | Age: 76
End: 2021-03-23
Attending: INTERNAL MEDICINE
Payer: MEDICARE

## 2021-03-23 PROCEDURE — 91300 PR COVID VAC PFIZER DIL RECON 30 MCG/0.3 ML IM: CPT

## 2021-03-23 PROCEDURE — 0002A PR COVID VAC PFIZER DIL RECON 30 MCG/0.3 ML IM: CPT

## 2021-03-28 ENCOUNTER — HEALTH MAINTENANCE LETTER (OUTPATIENT)
Age: 76
End: 2021-03-28

## 2021-09-12 ENCOUNTER — HEALTH MAINTENANCE LETTER (OUTPATIENT)
Age: 76
End: 2021-09-12

## 2022-04-24 ENCOUNTER — HEALTH MAINTENANCE LETTER (OUTPATIENT)
Age: 77
End: 2022-04-24

## 2022-10-30 ENCOUNTER — HEALTH MAINTENANCE LETTER (OUTPATIENT)
Age: 77
End: 2022-10-30

## 2023-06-01 ENCOUNTER — HEALTH MAINTENANCE LETTER (OUTPATIENT)
Age: 78
End: 2023-06-01

## 2025-08-28 ENCOUNTER — TELEPHONE (OUTPATIENT)
Dept: OTOLARYNGOLOGY | Facility: CLINIC | Age: 80
End: 2025-08-28
Payer: COMMERCIAL

## 2025-09-02 ENCOUNTER — OFFICE VISIT (OUTPATIENT)
Dept: PLASTIC SURGERY | Facility: CLINIC | Age: 80
End: 2025-09-02
Payer: MEDICARE

## 2025-09-02 DIAGNOSIS — C44.311 BASAL CELL CARCINOMA (BCC) OF LATERAL SIDE WALL OF NOSE: Primary | ICD-10-CM

## 2025-09-02 DIAGNOSIS — Z98.890 POSTOPERATIVE STATE: ICD-10-CM

## 2025-09-03 RX ORDER — CLINDAMYCIN HYDROCHLORIDE 150 MG/1
150 CAPSULE ORAL 3 TIMES DAILY
Qty: 21 CAPSULE | Refills: 0 | Status: SHIPPED | OUTPATIENT
Start: 2025-09-03 | End: 2025-09-10

## 2025-09-04 PROBLEM — C44.311 BASAL CELL CARCINOMA (BCC) OF LATERAL SIDE WALL OF NOSE: Status: ACTIVE | Noted: 2025-09-04
